# Patient Record
Sex: FEMALE | Race: ASIAN | NOT HISPANIC OR LATINO | Employment: OTHER | URBAN - METROPOLITAN AREA
[De-identification: names, ages, dates, MRNs, and addresses within clinical notes are randomized per-mention and may not be internally consistent; named-entity substitution may affect disease eponyms.]

---

## 2023-04-21 DIAGNOSIS — E78.5 HYPERLIPIDEMIA, UNSPECIFIED HYPERLIPIDEMIA TYPE: Primary | ICD-10-CM

## 2023-04-21 RX ORDER — ATORVASTATIN CALCIUM 40 MG/1
40 TABLET, FILM COATED ORAL DAILY
COMMUNITY
Start: 2014-09-15 | End: 2023-04-21 | Stop reason: SDUPTHER

## 2023-04-21 RX ORDER — ATORVASTATIN CALCIUM 40 MG/1
40 TABLET, FILM COATED ORAL DAILY
Qty: 30 TABLET | Refills: 2 | Status: SHIPPED | OUTPATIENT
Start: 2023-04-21

## 2023-06-19 PROBLEM — E11.9 DIABETES MELLITUS TYPE 2, CONTROLLED (MULTI): Status: ACTIVE | Noted: 2023-06-19

## 2023-06-19 PROBLEM — E04.1 LEFT THYROID NODULE: Status: ACTIVE | Noted: 2023-06-19

## 2023-06-19 PROBLEM — C50.919 BREAST CANCER (MULTI): Status: ACTIVE | Noted: 2023-06-19

## 2023-06-19 PROBLEM — E01.0 THYROMEGALY: Status: ACTIVE | Noted: 2023-06-19

## 2023-06-19 PROBLEM — E78.5 HYPERLIPIDEMIA: Status: ACTIVE | Noted: 2023-06-19

## 2023-09-05 DIAGNOSIS — E11.8 CONTROLLED DIABETES MELLITUS TYPE 2 WITH COMPLICATIONS, UNSPECIFIED WHETHER LONG TERM INSULIN USE (MULTI): Primary | ICD-10-CM

## 2023-09-05 RX ORDER — GLIMEPIRIDE 2 MG/1
TABLET ORAL
Qty: 180 TABLET | Refills: 3 | Status: SHIPPED | OUTPATIENT
Start: 2023-09-05

## 2024-11-04 ENCOUNTER — HOSPITAL ENCOUNTER (OUTPATIENT)
Dept: RADIOLOGY | Facility: CLINIC | Age: 79
Discharge: HOME | End: 2024-11-04
Payer: MEDICARE

## 2024-11-04 VITALS — HEIGHT: 63 IN | WEIGHT: 134.26 LBS | BODY MASS INDEX: 23.79 KG/M2

## 2024-11-04 DIAGNOSIS — Z12.31 ENCOUNTER FOR SCREENING MAMMOGRAM FOR MALIGNANT NEOPLASM OF BREAST: ICD-10-CM

## 2024-11-04 PROCEDURE — 77067 SCR MAMMO BI INCL CAD: CPT | Mod: LEFT SIDE | Performed by: RADIOLOGY

## 2024-11-04 PROCEDURE — 77067 SCR MAMMO BI INCL CAD: CPT | Mod: 52,LT

## 2024-11-04 PROCEDURE — 77063 BREAST TOMOSYNTHESIS BI: CPT | Mod: LEFT SIDE | Performed by: RADIOLOGY

## 2024-12-07 ENCOUNTER — APPOINTMENT (OUTPATIENT)
Dept: CARDIOLOGY | Facility: HOSPITAL | Age: 79
End: 2024-12-07
Payer: MEDICARE

## 2024-12-07 ENCOUNTER — APPOINTMENT (OUTPATIENT)
Dept: RADIOLOGY | Facility: HOSPITAL | Age: 79
End: 2024-12-07
Payer: MEDICARE

## 2024-12-07 ENCOUNTER — HOSPITAL ENCOUNTER (OUTPATIENT)
Facility: HOSPITAL | Age: 79
Setting detail: OBSERVATION
Discharge: HOME HEALTH | End: 2024-12-08
Attending: EMERGENCY MEDICINE | Admitting: SURGERY
Payer: MEDICARE
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
## 2024-12-07 DIAGNOSIS — S32.415A CLOSED NONDISPLACED FRACTURE OF ANTERIOR WALL OF LEFT ACETABULUM, INITIAL ENCOUNTER (MULTI): ICD-10-CM

## 2024-12-07 DIAGNOSIS — R52 ACUTE PAIN: ICD-10-CM
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
## 2024-12-07 LAB
BASOPHILS # BLD AUTO: 0.04 X10*3/UL (ref 0–0.1)
BASOPHILS NFR BLD AUTO: 0.4 %
EOSINOPHIL # BLD AUTO: 0.01 X10*3/UL (ref 0–0.4)
EOSINOPHIL NFR BLD AUTO: 0.1 %
ERYTHROCYTE [DISTWIDTH] IN BLOOD BY AUTOMATED COUNT: 11.8 % (ref 11.5–14.5)
HCT VFR BLD AUTO: 35.9 % (ref 36–46)
HGB BLD-MCNC: 12.2 G/DL (ref 12–16)
IMM GRANULOCYTES # BLD AUTO: 0.07 X10*3/UL (ref 0–0.5)
IMM GRANULOCYTES NFR BLD AUTO: 0.7 % (ref 0–0.9)
LYMPHOCYTES # BLD AUTO: 1.23 X10*3/UL (ref 0.8–3)
LYMPHOCYTES NFR BLD AUTO: 11.8 %
MCH RBC QN AUTO: 30.3 PG (ref 26–34)
MCHC RBC AUTO-ENTMCNC: 34 G/DL (ref 32–36)
MCV RBC AUTO: 89 FL (ref 80–100)
MONOCYTES # BLD AUTO: 0.47 X10*3/UL (ref 0.05–0.8)
MONOCYTES NFR BLD AUTO: 4.5 %
NEUTROPHILS # BLD AUTO: 8.62 X10*3/UL (ref 1.6–5.5)
NEUTROPHILS NFR BLD AUTO: 82.5 %
NRBC BLD-RTO: 0 /100 WBCS (ref 0–0)
PLATELET # BLD AUTO: 213 X10*3/UL (ref 150–450)
RBC # BLD AUTO: 4.03 X10*6/UL (ref 4–5.2)
WBC # BLD AUTO: 10.4 X10*3/UL (ref 4.4–11.3)

## 2024-12-07 PROCEDURE — 74176 CT ABD & PELVIS W/O CONTRAST: CPT | Mod: FOREIGN READ | Performed by: RADIOLOGY

## 2024-12-07 PROCEDURE — 73552 X-RAY EXAM OF FEMUR 2/>: CPT | Mod: FOREIGN READ | Performed by: RADIOLOGY

## 2024-12-07 PROCEDURE — 2500000001 HC RX 250 WO HCPCS SELF ADMINISTERED DRUGS (ALT 637 FOR MEDICARE OP): Performed by: EMERGENCY MEDICINE

## 2024-12-07 PROCEDURE — 72170 X-RAY EXAM OF PELVIS: CPT | Mod: FOREIGN READ | Performed by: RADIOLOGY

## 2024-12-07 PROCEDURE — 73552 X-RAY EXAM OF FEMUR 2/>: CPT | Mod: LT

## 2024-12-07 PROCEDURE — 96372 THER/PROPH/DIAG INJ SC/IM: CPT | Performed by: EMERGENCY MEDICINE

## 2024-12-07 PROCEDURE — G0378 HOSPITAL OBSERVATION PER HR: HCPCS

## 2024-12-07 PROCEDURE — 36415 COLL VENOUS BLD VENIPUNCTURE: CPT | Performed by: EMERGENCY MEDICINE

## 2024-12-07 PROCEDURE — 93005 ELECTROCARDIOGRAM TRACING: CPT

## 2024-12-07 PROCEDURE — 2500000004 HC RX 250 GENERAL PHARMACY W/ HCPCS (ALT 636 FOR OP/ED): Performed by: EMERGENCY MEDICINE

## 2024-12-07 PROCEDURE — 85025 COMPLETE CBC W/AUTO DIFF WBC: CPT | Performed by: EMERGENCY MEDICINE

## 2024-12-07 PROCEDURE — 84075 ASSAY ALKALINE PHOSPHATASE: CPT | Performed by: EMERGENCY MEDICINE

## 2024-12-07 PROCEDURE — 74176 CT ABD & PELVIS W/O CONTRAST: CPT

## 2024-12-07 PROCEDURE — 99285 EMERGENCY DEPT VISIT HI MDM: CPT | Mod: 25 | Performed by: EMERGENCY MEDICINE

## 2024-12-07 PROCEDURE — 72170 X-RAY EXAM OF PELVIS: CPT

## 2024-12-07 RX ORDER — ONDANSETRON HYDROCHLORIDE 2 MG/ML
4 INJECTION, SOLUTION INTRAVENOUS EVERY 8 HOURS PRN
Status: DISCONTINUED | OUTPATIENT
Start: 2024-12-07 | End: 2024-12-08 | Stop reason: HOSPADM

## 2024-12-07 RX ORDER — OXYCODONE AND ACETAMINOPHEN 5; 325 MG/1; MG/1
1 TABLET ORAL ONCE
Status: COMPLETED | OUTPATIENT
Start: 2024-12-07 | End: 2024-12-07

## 2024-12-07 RX ORDER — LISINOPRIL 10 MG/1
10 TABLET ORAL DAILY
Status: DISCONTINUED | OUTPATIENT
Start: 2024-12-08 | End: 2024-12-08 | Stop reason: HOSPADM

## 2024-12-07 RX ORDER — KETOROLAC TROMETHAMINE 30 MG/ML
30 INJECTION, SOLUTION INTRAMUSCULAR; INTRAVENOUS ONCE
Status: COMPLETED | OUTPATIENT
Start: 2024-12-07 | End: 2024-12-07

## 2024-12-07 RX ORDER — GLIMEPIRIDE 2 MG/1
2 TABLET ORAL
Status: DISCONTINUED | OUTPATIENT
Start: 2024-12-08 | End: 2024-12-08 | Stop reason: HOSPADM

## 2024-12-07 RX ORDER — ACETAMINOPHEN 325 MG/1
975 TABLET ORAL EVERY 6 HOURS SCHEDULED
Status: DISCONTINUED | OUTPATIENT
Start: 2024-12-08 | End: 2024-12-08 | Stop reason: HOSPADM

## 2024-12-07 RX ORDER — ATORVASTATIN CALCIUM 40 MG/1
40 TABLET, FILM COATED ORAL DAILY
Status: DISCONTINUED | OUTPATIENT
Start: 2024-12-08 | End: 2024-12-08 | Stop reason: HOSPADM

## 2024-12-07 RX ORDER — OXYCODONE HYDROCHLORIDE 5 MG/1
5 TABLET ORAL EVERY 6 HOURS PRN
Status: DISCONTINUED | OUTPATIENT
Start: 2024-12-07 | End: 2024-12-08

## 2024-12-07 RX ORDER — GABAPENTIN 100 MG/1
100 CAPSULE ORAL 3 TIMES DAILY
Status: DISCONTINUED | OUTPATIENT
Start: 2024-12-07 | End: 2024-12-08 | Stop reason: HOSPADM

## 2024-12-07 RX ORDER — LIDOCAINE 560 MG/1
1 PATCH PERCUTANEOUS; TOPICAL; TRANSDERMAL DAILY
Status: DISCONTINUED | OUTPATIENT
Start: 2024-12-08 | End: 2024-12-08 | Stop reason: HOSPADM

## 2024-12-07 RX ORDER — METFORMIN HYDROCHLORIDE 500 MG/1
500 TABLET, EXTENDED RELEASE ORAL 2 TIMES DAILY
Status: DISCONTINUED | OUTPATIENT
Start: 2024-12-07 | End: 2024-12-08 | Stop reason: HOSPADM

## 2024-12-07 RX ADMIN — OXYCODONE HYDROCHLORIDE AND ACETAMINOPHEN 1 TABLET: 5; 325 TABLET ORAL at 22:07

## 2024-12-07 RX ADMIN — KETOROLAC TROMETHAMINE 30 MG: 30 INJECTION, SOLUTION INTRAMUSCULAR at 20:22

## 2024-12-07 ASSESSMENT — PAIN DESCRIPTION - FREQUENCY: FREQUENCY: CONSTANT/CONTINUOUS

## 2024-12-07 ASSESSMENT — LIFESTYLE VARIABLES
TOTAL SCORE: 0
HAVE YOU EVER FELT YOU SHOULD CUT DOWN ON YOUR DRINKING: NO
HAVE PEOPLE ANNOYED YOU BY CRITICIZING YOUR DRINKING: NO
EVER FELT BAD OR GUILTY ABOUT YOUR DRINKING: NO
EVER HAD A DRINK FIRST THING IN THE MORNING TO STEADY YOUR NERVES TO GET RID OF A HANGOVER: NO

## 2024-12-07 ASSESSMENT — PAIN - FUNCTIONAL ASSESSMENT: PAIN_FUNCTIONAL_ASSESSMENT: 0-10

## 2024-12-07 ASSESSMENT — PAIN DESCRIPTION - LOCATION
LOCATION: HIP
LOCATION: HIP

## 2024-12-07 ASSESSMENT — PAIN DESCRIPTION - ORIENTATION
ORIENTATION: LEFT
ORIENTATION: LEFT

## 2024-12-07 ASSESSMENT — PAIN SCALES - WONG BAKER: WONGBAKER_NUMERICALRESPONSE: HURTS LITTLE BIT

## 2024-12-07 ASSESSMENT — COLUMBIA-SUICIDE SEVERITY RATING SCALE - C-SSRS
1. IN THE PAST MONTH, HAVE YOU WISHED YOU WERE DEAD OR WISHED YOU COULD GO TO SLEEP AND NOT WAKE UP?: NO
2. HAVE YOU ACTUALLY HAD ANY THOUGHTS OF KILLING YOURSELF?: NO
6. HAVE YOU EVER DONE ANYTHING, STARTED TO DO ANYTHING, OR PREPARED TO DO ANYTHING TO END YOUR LIFE?: NO

## 2024-12-07 ASSESSMENT — PAIN DESCRIPTION - PAIN TYPE: TYPE: ACUTE PAIN

## 2024-12-07 ASSESSMENT — PAIN SCALES - GENERAL: PAINLEVEL_OUTOF10: 10 - WORST POSSIBLE PAIN

## 2024-12-08 VITALS
HEART RATE: 79 BPM | OXYGEN SATURATION: 96 % | DIASTOLIC BLOOD PRESSURE: 61 MMHG | SYSTOLIC BLOOD PRESSURE: 131 MMHG | WEIGHT: 125 LBS | RESPIRATION RATE: 16 BRPM | HEIGHT: 64 IN | BODY MASS INDEX: 21.34 KG/M2 | TEMPERATURE: 97.5 F
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
## 2024-12-08 LAB
ALBUMIN SERPL BCP-MCNC: 4.1 G/DL (ref 3.4–5)
ALP SERPL-CCNC: 71 U/L (ref 33–136)
ALT SERPL W P-5'-P-CCNC: 16 U/L (ref 7–45)
ANION GAP SERPL CALC-SCNC: 10 MMOL/L (ref 10–20)
ANION GAP SERPL CALC-SCNC: 12 MMOL/L (ref 10–20)
AST SERPL W P-5'-P-CCNC: 19 U/L (ref 9–39)
BILIRUB SERPL-MCNC: 0.6 MG/DL (ref 0–1.2)
BUN SERPL-MCNC: 18 MG/DL (ref 6–23)
BUN SERPL-MCNC: 19 MG/DL (ref 6–23)
CALCIUM SERPL-MCNC: 8.7 MG/DL (ref 8.6–10.3)
CALCIUM SERPL-MCNC: 9.2 MG/DL (ref 8.6–10.3)
CHLORIDE SERPL-SCNC: 102 MMOL/L (ref 98–107)
CHLORIDE SERPL-SCNC: 104 MMOL/L (ref 98–107)
CO2 SERPL-SCNC: 26 MMOL/L (ref 21–32)
CO2 SERPL-SCNC: 27 MMOL/L (ref 21–32)
CREAT SERPL-MCNC: 0.86 MG/DL (ref 0.5–1.05)
CREAT SERPL-MCNC: 1.04 MG/DL (ref 0.5–1.05)
EGFRCR SERPLBLD CKD-EPI 2021: 54 ML/MIN/1.73M*2
EGFRCR SERPLBLD CKD-EPI 2021: 68 ML/MIN/1.73M*2
ERYTHROCYTE [DISTWIDTH] IN BLOOD BY AUTOMATED COUNT: 11.8 % (ref 11.5–14.5)
GLUCOSE BLD MANUAL STRIP-MCNC: 98 MG/DL (ref 74–99)
GLUCOSE SERPL-MCNC: 105 MG/DL (ref 74–99)
GLUCOSE SERPL-MCNC: 211 MG/DL (ref 74–99)
HCT VFR BLD AUTO: 33.6 % (ref 36–46)
HGB BLD-MCNC: 11.1 G/DL (ref 12–16)
MCH RBC QN AUTO: 29.9 PG (ref 26–34)
MCHC RBC AUTO-ENTMCNC: 33 G/DL (ref 32–36)
MCV RBC AUTO: 91 FL (ref 80–100)
NRBC BLD-RTO: 0 /100 WBCS (ref 0–0)
PLATELET # BLD AUTO: 191 X10*3/UL (ref 150–450)
POTASSIUM SERPL-SCNC: 4 MMOL/L (ref 3.5–5.3)
POTASSIUM SERPL-SCNC: 4.3 MMOL/L (ref 3.5–5.3)
PROT SERPL-MCNC: 6.8 G/DL (ref 6.4–8.2)
RBC # BLD AUTO: 3.71 X10*6/UL (ref 4–5.2)
SODIUM SERPL-SCNC: 136 MMOL/L (ref 136–145)
SODIUM SERPL-SCNC: 137 MMOL/L (ref 136–145)
WBC # BLD AUTO: 5.7 X10*3/UL (ref 4.4–11.3)

## 2024-12-08 PROCEDURE — 99223 1ST HOSP IP/OBS HIGH 75: CPT | Performed by: NURSE PRACTITIONER

## 2024-12-08 PROCEDURE — 99223 1ST HOSP IP/OBS HIGH 75: CPT | Performed by: SURGERY

## 2024-12-08 PROCEDURE — 2500000004 HC RX 250 GENERAL PHARMACY W/ HCPCS (ALT 636 FOR OP/ED): Performed by: NURSE PRACTITIONER

## 2024-12-08 PROCEDURE — 97165 OT EVAL LOW COMPLEX 30 MIN: CPT | Mod: GO

## 2024-12-08 PROCEDURE — 80048 BASIC METABOLIC PNL TOTAL CA: CPT | Performed by: NURSE PRACTITIONER

## 2024-12-08 PROCEDURE — G0378 HOSPITAL OBSERVATION PER HR: HCPCS

## 2024-12-08 PROCEDURE — 96374 THER/PROPH/DIAG INJ IV PUSH: CPT

## 2024-12-08 PROCEDURE — 85027 COMPLETE CBC AUTOMATED: CPT | Performed by: NURSE PRACTITIONER

## 2024-12-08 PROCEDURE — 2500000005 HC RX 250 GENERAL PHARMACY W/O HCPCS: Performed by: NURSE PRACTITIONER

## 2024-12-08 PROCEDURE — 97161 PT EVAL LOW COMPLEX 20 MIN: CPT | Mod: GP

## 2024-12-08 PROCEDURE — 2500000001 HC RX 250 WO HCPCS SELF ADMINISTERED DRUGS (ALT 637 FOR MEDICARE OP): Performed by: NURSE PRACTITIONER

## 2024-12-08 PROCEDURE — 99221 1ST HOSP IP/OBS SF/LOW 40: CPT | Performed by: STUDENT IN AN ORGANIZED HEALTH CARE EDUCATION/TRAINING PROGRAM

## 2024-12-08 PROCEDURE — 2500000001 HC RX 250 WO HCPCS SELF ADMINISTERED DRUGS (ALT 637 FOR MEDICARE OP): Performed by: REGISTERED NURSE

## 2024-12-08 PROCEDURE — 99238 HOSP IP/OBS DSCHRG MGMT 30/<: CPT | Performed by: REGISTERED NURSE

## 2024-12-08 PROCEDURE — 82947 ASSAY GLUCOSE BLOOD QUANT: CPT

## 2024-12-08 PROCEDURE — 36415 COLL VENOUS BLD VENIPUNCTURE: CPT | Performed by: NURSE PRACTITIONER

## 2024-12-08 RX ORDER — IBUPROFEN 600 MG/1
600 TABLET ORAL 3 TIMES DAILY PRN
Qty: 12 TABLET | Refills: 0 | Status: SHIPPED | OUTPATIENT
Start: 2024-12-08 | End: 2024-12-15

## 2024-12-08 RX ORDER — GABAPENTIN 100 MG/1
100 CAPSULE ORAL 3 TIMES DAILY
Qty: 21 CAPSULE | Refills: 0 | Status: SHIPPED | OUTPATIENT
Start: 2024-12-08 | End: 2024-12-15

## 2024-12-08 RX ORDER — OXYCODONE HYDROCHLORIDE 5 MG/1
5 TABLET ORAL EVERY 6 HOURS PRN
Status: DISCONTINUED | OUTPATIENT
Start: 2024-12-08 | End: 2024-12-08 | Stop reason: HOSPADM

## 2024-12-08 RX ORDER — ACETAMINOPHEN 325 MG/1
1000 TABLET ORAL EVERY 6 HOURS SCHEDULED
Qty: 84 TABLET | Refills: 0 | Status: SHIPPED | OUTPATIENT
Start: 2024-12-08 | End: 2024-12-15

## 2024-12-08 RX ORDER — OXYCODONE HYDROCHLORIDE 5 MG/1
5 TABLET ORAL 3 TIMES DAILY PRN
Qty: 10 TABLET | Refills: 0 | Status: SHIPPED | OUTPATIENT
Start: 2024-12-08

## 2024-12-08 RX ADMIN — GLIMEPIRIDE 2 MG: 2 TABLET ORAL at 08:35

## 2024-12-08 RX ADMIN — METFORMIN HYDROCHLORIDE 500 MG: 500 TABLET, EXTENDED RELEASE ORAL at 08:36

## 2024-12-08 RX ADMIN — GABAPENTIN 100 MG: 100 CAPSULE ORAL at 00:34

## 2024-12-08 RX ADMIN — LISINOPRIL 10 MG: 10 TABLET ORAL at 08:35

## 2024-12-08 RX ADMIN — ATORVASTATIN CALCIUM 40 MG: 40 TABLET, FILM COATED ORAL at 08:35

## 2024-12-08 RX ADMIN — ACETAMINOPHEN 975 MG: 325 TABLET, FILM COATED ORAL at 02:01

## 2024-12-08 RX ADMIN — LIDOCAINE 1 PATCH: 4 PATCH TOPICAL at 08:36

## 2024-12-08 RX ADMIN — HYDROMORPHONE HYDROCHLORIDE 0.5 MG: 1 INJECTION, SOLUTION INTRAMUSCULAR; INTRAVENOUS; SUBCUTANEOUS at 02:08

## 2024-12-08 RX ADMIN — OXYCODONE HYDROCHLORIDE 5 MG: 5 TABLET ORAL at 12:48

## 2024-12-08 RX ADMIN — GABAPENTIN 100 MG: 100 CAPSULE ORAL at 08:35

## 2024-12-08 RX ADMIN — ACETAMINOPHEN 975 MG: 325 TABLET, FILM COATED ORAL at 06:05

## 2024-12-08 SDOH — ECONOMIC STABILITY: HOUSING INSECURITY: IN THE LAST 12 MONTHS, WAS THERE A TIME WHEN YOU WERE NOT ABLE TO PAY THE MORTGAGE OR RENT ON TIME?: NO

## 2024-12-08 SDOH — SOCIAL STABILITY: SOCIAL INSECURITY
WITHIN THE LAST YEAR, HAVE YOU BEEN KICKED, HIT, SLAPPED, OR OTHERWISE PHYSICALLY HURT BY YOUR PARTNER OR EX-PARTNER?: NO

## 2024-12-08 SDOH — ECONOMIC STABILITY: HOUSING INSECURITY: AT ANY TIME IN THE PAST 12 MONTHS, WERE YOU HOMELESS OR LIVING IN A SHELTER (INCLUDING NOW)?: NO

## 2024-12-08 SDOH — ECONOMIC STABILITY: FOOD INSECURITY: WITHIN THE PAST 12 MONTHS, THE FOOD YOU BOUGHT JUST DIDN'T LAST AND YOU DIDN'T HAVE MONEY TO GET MORE.: NEVER TRUE

## 2024-12-08 SDOH — SOCIAL STABILITY: SOCIAL INSECURITY: HAVE YOU HAD ANY THOUGHTS OF HARMING ANYONE ELSE?: NO

## 2024-12-08 SDOH — SOCIAL STABILITY: SOCIAL INSECURITY
WITHIN THE LAST YEAR, HAVE YOU BEEN RAPED OR FORCED TO HAVE ANY KIND OF SEXUAL ACTIVITY BY YOUR PARTNER OR EX-PARTNER?: NO

## 2024-12-08 SDOH — ECONOMIC STABILITY: FOOD INSECURITY: WITHIN THE PAST 12 MONTHS, YOU WORRIED THAT YOUR FOOD WOULD RUN OUT BEFORE YOU GOT THE MONEY TO BUY MORE.: NEVER TRUE

## 2024-12-08 SDOH — ECONOMIC STABILITY: FOOD INSECURITY: HOW HARD IS IT FOR YOU TO PAY FOR THE VERY BASICS LIKE FOOD, HOUSING, MEDICAL CARE, AND HEATING?: NOT HARD AT ALL

## 2024-12-08 SDOH — SOCIAL STABILITY: SOCIAL INSECURITY: WITHIN THE LAST YEAR, HAVE YOU BEEN AFRAID OF YOUR PARTNER OR EX-PARTNER?: NO

## 2024-12-08 SDOH — ECONOMIC STABILITY: INCOME INSECURITY: IN THE PAST 12 MONTHS HAS THE ELECTRIC, GAS, OIL, OR WATER COMPANY THREATENED TO SHUT OFF SERVICES IN YOUR HOME?: NO

## 2024-12-08 SDOH — SOCIAL STABILITY: SOCIAL INSECURITY: WITHIN THE LAST YEAR, HAVE YOU BEEN HUMILIATED OR EMOTIONALLY ABUSED IN OTHER WAYS BY YOUR PARTNER OR EX-PARTNER?: NO

## 2024-12-08 SDOH — ECONOMIC STABILITY: HOUSING INSECURITY: IN THE PAST 12 MONTHS, HOW MANY TIMES HAVE YOU MOVED WHERE YOU WERE LIVING?: 1

## 2024-12-08 SDOH — SOCIAL STABILITY: SOCIAL INSECURITY: ARE THERE ANY APPARENT SIGNS OF INJURIES/BEHAVIORS THAT COULD BE RELATED TO ABUSE/NEGLECT?: NO

## 2024-12-08 SDOH — SOCIAL STABILITY: SOCIAL INSECURITY: ARE YOU OR HAVE YOU BEEN THREATENED OR ABUSED PHYSICALLY, EMOTIONALLY, OR SEXUALLY BY ANYONE?: NO

## 2024-12-08 SDOH — SOCIAL STABILITY: SOCIAL INSECURITY: ABUSE: ADULT

## 2024-12-08 SDOH — SOCIAL STABILITY: SOCIAL INSECURITY: HAS ANYONE EVER THREATENED TO HURT YOUR FAMILY OR YOUR PETS?: NO

## 2024-12-08 SDOH — SOCIAL STABILITY: SOCIAL INSECURITY: WERE YOU ABLE TO COMPLETE ALL THE BEHAVIORAL HEALTH SCREENINGS?: YES

## 2024-12-08 SDOH — SOCIAL STABILITY: SOCIAL INSECURITY: HAVE YOU HAD THOUGHTS OF HARMING ANYONE ELSE?: NO

## 2024-12-08 SDOH — ECONOMIC STABILITY: TRANSPORTATION INSECURITY: IN THE PAST 12 MONTHS, HAS LACK OF TRANSPORTATION KEPT YOU FROM MEDICAL APPOINTMENTS OR FROM GETTING MEDICATIONS?: NO

## 2024-12-08 SDOH — SOCIAL STABILITY: SOCIAL INSECURITY: DO YOU FEEL UNSAFE GOING BACK TO THE PLACE WHERE YOU ARE LIVING?: NO

## 2024-12-08 SDOH — SOCIAL STABILITY: SOCIAL INSECURITY: DOES ANYONE TRY TO KEEP YOU FROM HAVING/CONTACTING OTHER FRIENDS OR DOING THINGS OUTSIDE YOUR HOME?: NO

## 2024-12-08 SDOH — SOCIAL STABILITY: SOCIAL INSECURITY: DO YOU FEEL ANYONE HAS EXPLOITED OR TAKEN ADVANTAGE OF YOU FINANCIALLY OR OF YOUR PERSONAL PROPERTY?: NO

## 2024-12-08 ASSESSMENT — ACTIVITIES OF DAILY LIVING (ADL)
LACK_OF_TRANSPORTATION: NO
TOILETING: INDEPENDENT
ADEQUATE_TO_COMPLETE_ADL: YES
BATHING: INDEPENDENT
HEARING - LEFT EAR: HEARING AID
JUDGMENT_ADEQUATE_SAFELY_COMPLETE_DAILY_ACTIVITIES: YES
HEARING - RIGHT EAR: HEARING AID
WALKS IN HOME: INDEPENDENT
FEEDING YOURSELF: INDEPENDENT
GROOMING: INDEPENDENT
PATIENT'S MEMORY ADEQUATE TO SAFELY COMPLETE DAILY ACTIVITIES?: YES
LACK_OF_TRANSPORTATION: NO
DRESSING YOURSELF: INDEPENDENT

## 2024-12-08 ASSESSMENT — PAIN SCALES - GENERAL
PAINLEVEL_OUTOF10: 2
PAINLEVEL_OUTOF10: 9
PAINLEVEL_OUTOF10: 6
PAINLEVEL_OUTOF10: 2
PAINLEVEL_OUTOF10: 3
PAINLEVEL_OUTOF10: 2
PAINLEVEL_OUTOF10: 9
PAINLEVEL_OUTOF10: 5 - MODERATE PAIN
PAINLEVEL_OUTOF10: 6

## 2024-12-08 ASSESSMENT — COGNITIVE AND FUNCTIONAL STATUS - GENERAL
PATIENT BASELINE BEDBOUND: NO
CLIMB 3 TO 5 STEPS WITH RAILING: A LITTLE
DAILY ACTIVITIY SCORE: 21
CLIMB 3 TO 5 STEPS WITH RAILING: A LITTLE
CLIMB 3 TO 5 STEPS WITH RAILING: A LITTLE
TURNING FROM BACK TO SIDE WHILE IN FLAT BAD: A LITTLE
WALKING IN HOSPITAL ROOM: A LITTLE
MOVING TO AND FROM BED TO CHAIR: A LITTLE
TOILETING: A LITTLE
MOBILITY SCORE: 18
DAILY ACTIVITIY SCORE: 24
WALKING IN HOSPITAL ROOM: A LITTLE
MOVING FROM LYING ON BACK TO SITTING ON SIDE OF FLAT BED WITH BEDRAILS: A LITTLE
WALKING IN HOSPITAL ROOM: A LITTLE
STANDING UP FROM CHAIR USING ARMS: A LITTLE
HELP NEEDED FOR BATHING: A LITTLE
MOBILITY SCORE: 22
DAILY ACTIVITIY SCORE: 24
MOBILITY SCORE: 22
DRESSING REGULAR LOWER BODY CLOTHING: A LITTLE

## 2024-12-08 ASSESSMENT — LIFESTYLE VARIABLES
AUDIT-C TOTAL SCORE: 0
SKIP TO QUESTIONS 9-10: 1
HOW OFTEN DO YOU HAVE 6 OR MORE DRINKS ON ONE OCCASION: NEVER
HOW OFTEN DO YOU HAVE A DRINK CONTAINING ALCOHOL: NEVER
AUDIT-C TOTAL SCORE: 0
HOW MANY STANDARD DRINKS CONTAINING ALCOHOL DO YOU HAVE ON A TYPICAL DAY: PATIENT DOES NOT DRINK

## 2024-12-08 ASSESSMENT — PAIN DESCRIPTION - LOCATION
LOCATION: HIP

## 2024-12-08 ASSESSMENT — PAIN - FUNCTIONAL ASSESSMENT
PAIN_FUNCTIONAL_ASSESSMENT: 0-10

## 2024-12-08 ASSESSMENT — PAIN SCALES - WONG BAKER: WONGBAKER_NUMERICALRESPONSE: NO HURT

## 2024-12-08 ASSESSMENT — PAIN DESCRIPTION - ORIENTATION
ORIENTATION: LEFT

## 2024-12-08 ASSESSMENT — PATIENT HEALTH QUESTIONNAIRE - PHQ9
1. LITTLE INTEREST OR PLEASURE IN DOING THINGS: NOT AT ALL
SUM OF ALL RESPONSES TO PHQ9 QUESTIONS 1 & 2: 0
2. FEELING DOWN, DEPRESSED OR HOPELESS: NOT AT ALL

## 2024-12-08 ASSESSMENT — PAIN DESCRIPTION - PAIN TYPE: TYPE: ACUTE PAIN

## 2024-12-08 NOTE — H&P
Trauma History and Physical        Subjective   Patient is a 79 y.o. female admitted on 12/7/2024  7:41 PM  Activation Date: n/a  Activation Time: n/a  Trauma Activation Level: n/a  Date of injury: 12/7/24  Time of injury: 1600    HPI:  Erin Martel is a 79 y.o. female with a PMH of HTN, HLD, DM type II, and breast CA s/p right mastectomy who presented to the ED today after a fall in her driveway.  Patient reports that she went to her mailbox around 4 PM and after she turned around, she believes she slipped on some ice and fell down onto her left hip area.  Patient denies any LOC or hitting her head.  She reports that she was able to get on her hands and knees and crawl up the driveway in order to get help.  She also states that she was able to stand after the fall, but with increased pain in the left hip.  She states that she took Tylenol around 5 PM for the pain, but it did not help much, so she decided to come to the ED.  She denies any pain to any other area besides her left hip.  Patient denies any use of blood thinners.  Patient does have a temporary 2-week Holter monitor to her left chest.  She reports that this is part of her routine wellness program to prophylactically check for any arrhythmias in asymptomatic patients.  Patient denies any lightheadedness, dizziness, chest pain, SOB, numbness, or tingling.    CT A/P done in ED shows fractures of the anterior left acetabulum as well as a left inferior pubic ramus.  There is also a small hemorrhage in the left hemipelvis with prominent edema in the left adductor musculature.  There is a compression deformity at L3, which the patient states is chronic from a previous fall.  Patient's lab work is unremarkable, other than her glucose of 211.  Patient to be admitted to the trauma service for observation and evaluation by PT/OT for potential needs.      Mechanism of injury: fall  Method of Arrival: private vehicle  Prior to arrival: n/a    PRIMARY  SURVEY:  Airway: patent  Breathing: equal breath sounds and unlabored  Circulation: pulses intact and equal and no obvious source of hemorrhage  Disability:  GCS 15, A&Ox3  Exposure/Environment: clothing removed and injuries noted    Procedures: n/a    SECONDARY SURVEY:    Past Medical History:   Diagnosis Date    Malignant neoplasm of unspecified site of right female breast     Breast carcinoma, female, right     Past Surgical History:   Procedure Laterality Date    MASTECTOMY Right 1987     (Not in a hospital admission)    Patient has no known allergies.     Family History   Problem Relation Name Age of Onset    Breast cancer Sister  60 - 69       Objective   Vitals:  Most Recent:  Vitals:    12/07/24 1928   BP: 143/63   Pulse: 99   Resp: 16   Temp: 36.8 °C (98.2 °F)   SpO2: 98%       24hr Min/Max:  Temp  Min: 36.8 °C (98.2 °F)  Max: 36.8 °C (98.2 °F)  Pulse  Min: 99  Max: 99  BP  Min: 143/63  Max: 143/63  Resp  Min: 16  Max: 16  SpO2  Min: 98 %  Max: 98 %      Review of Systems:  A 10 point review of system is negative except for what is mentioned in the HPI        Physical exam:    NEURO: A&O x3, GCS, CN II-XII intact, DAMON equally, muscle strength 5/5 in BUE and BLE, no sensory deficits  HEENT: Head: NC/AT, No lacerations or abrasions, no bony step offs, midface stable. Eye: PERRL, EOMI. Ears: Canals without blood or CSF drainage, TMs clear, external ear without laceration. Nose: Nasal septum midline, no crepitus or septal hematoma. Throat: Oral mucosa and tongue without lacerations, teeth in place.   NECK: No cervical spine tenderness or step offs, no lacerations or abrasions, tracheal midline. No JVD.  RESPIRATORY/CHEST: No abrasions, contusions, crepitus or tenderness to palpation. Non-labored, equal chest expansion, CTAB, no wheezes/rhonchi. Holter monitor to left chest (preventative)  CV: RRR, no M/R/G. Pulses bilateral: 2+ radial, 2+DP, 2+PT,  2+femoral and 2+ carotid. Cap refill < 2sec  ABDOMEN: soft,  nontender, nondistended, +BS. No scars, abrasions or lacerations.  PELVIS: Stable to AP and lateral compression, normal external genitalia, no pain with palpation/manipulation  BACK/SPINE: No T/L spine tenderness, no step offs or deformity noted. No abrasions, hematomas or lacerations noted.   EXTREMITIES: No edema or cyanosis. Normal ROM w/o pain to right leg. Patient has limited ROM to left leg due to pain. Tenderness over lateral hip area with very faint bruising. No deformities, lacerations or contusions.   SKIN: warm and dry, no rash or lesions.     Lab/Radiology/Diagnostic Review:  Results for orders placed or performed during the hospital encounter of 12/07/24 (from the past 24 hours)   CBC and Auto Differential   Result Value Ref Range    WBC 10.4 4.4 - 11.3 x10*3/uL    nRBC 0.0 0.0 - 0.0 /100 WBCs    RBC 4.03 4.00 - 5.20 x10*6/uL    Hemoglobin 12.2 12.0 - 16.0 g/dL    Hematocrit 35.9 (L) 36.0 - 46.0 %    MCV 89 80 - 100 fL    MCH 30.3 26.0 - 34.0 pg    MCHC 34.0 32.0 - 36.0 g/dL    RDW 11.8 11.5 - 14.5 %    Platelets 213 150 - 450 x10*3/uL    Neutrophils % 82.5 40.0 - 80.0 %    Immature Granulocytes %, Automated 0.7 0.0 - 0.9 %    Lymphocytes % 11.8 13.0 - 44.0 %    Monocytes % 4.5 2.0 - 10.0 %    Eosinophils % 0.1 0.0 - 6.0 %    Basophils % 0.4 0.0 - 2.0 %    Neutrophils Absolute 8.62 (H) 1.60 - 5.50 x10*3/uL    Immature Granulocytes Absolute, Automated 0.07 0.00 - 0.50 x10*3/uL    Lymphocytes Absolute 1.23 0.80 - 3.00 x10*3/uL    Monocytes Absolute 0.47 0.05 - 0.80 x10*3/uL    Eosinophils Absolute 0.01 0.00 - 0.40 x10*3/uL    Basophils Absolute 0.04 0.00 - 0.10 x10*3/uL     CT abdomen pelvis wo IV contrast    Result Date: 12/7/2024  STUDY: CT Abdomen and Pelvis without IV Contrast; 12/7/2024 at 9:06 PM INDICATION: Status post fall. COMPARISON: None available. ACCESSION NUMBER(S): [id number] ORDERING CLINICIAN: ANGELITO BOLAND TECHNIQUE: CT of the abdomen and pelvis was performed.  Contiguous axial images  were obtained at 3 mm slice thickness through the abdomen and pelvis. Coronal and sagittal reconstructions at 3 mm slice thickness were performed. No intravenous contrast was administered.  Automated mA/kV exposure control was utilized and patient examination was performed in strict accordance with principles of ALARA. FINDINGS: Please note that the evaluation of vessels, lymph nodes and organs is limited without intravenous contrast.  Evaluation of the abdominal viscera is suboptimal due to lack of IV contrast.  No acute findings are seen within the unenhanced liver, spleen, pancreas, or adrenal glands.  No acute findings are seen within either unenhanced kidney.  Cholecystectomy clips are present. No dilated loops of small bowel or colon are visualized.  There are diverticula seen in the sigmoid colon.  There is no evidence of diverticulitis.  There is no evidence for free intraperitoneal air. The appendix is normal in appearance.  No enlarged lymph nodes are seen within the pelvic sidewalls, iliac chains, or retroperitoneum. No prominent edema is seen within the psoas musculature.  There are fractures involving the anterior aspect of left acetabulum, as well as the left pubic ramus.  There is minimal hemorrhage seen in the left pelvic sidewall.  There is edema seen within the adductor musculature.  There is a compression deformity of L3, pituitary age.    1.  Fractures of the anterior left acetabulum as well as the left inferior pubic ramus.  Minimal hemorrhage in the left hemipelvis, with prominent edema in the left adductor musculature. 2.  Compression deformity of L3, of indeterminate age. 3.  Evaluation for visceral trauma is limited due to lack of IV contrast.  No acute findings appreciated within the unenhanced viscera. 4.  Sigmoid diverticulosis, with no evidence of diverticulitis. Signed by Scout Mccauley MD    XR femur left 2+ views    Result Date: 12/7/2024  STUDY: Femur Radiographs; 12/07/2024 8:09PM.   AP pelvis 8:08 PM INDICATION: Fall. COMPARISON: None Available. ACCESSION NUMBER(S): [id number], [id number] ORDERING CLINICIAN: ANGELITO BOLAND TECHNIQUE:  Two view(s) (four images) of the left femur.  One view of the AP pelvis. FINDINGS: AP view of the pelvis of shows focal irregularity of the inferior pubic ramus on the left.  This is likely related to a fracture of indeterminate age.  The rest of the pelvis appears intact.  There are degenerative changes in the included lower lumbar spine and sacroiliac joints.  Pelvic calcifications may be vascular.  Visualization is limited. Views of the left femur show no evidence of acute fracture or dislocation.  There are mild degenerative changes of the hip and knee.  The alignment is anatomic.  No soft tissue abnormality is seen.    1.Fracture of the left inferior pubic ramus of indeterminate age.  CT could be considered as clinically indicated. 2.No acute bony abnormalities of the left femur. Signed by Rebekah Lazo DO    XR pelvis 1-2 views    Result Date: 12/7/2024  STUDY: Femur Radiographs; 12/07/2024 8:09PM.  AP pelvis 8:08 PM INDICATION: Fall. COMPARISON: None Available. ACCESSION NUMBER(S): [id number], [id number] ORDERING CLINICIAN: ANGELITO BOLAND TECHNIQUE:  Two view(s) (four images) of the left femur.  One view of the AP pelvis. FINDINGS: AP view of the pelvis of shows focal irregularity of the inferior pubic ramus on the left.  This is likely related to a fracture of indeterminate age.  The rest of the pelvis appears intact.  There are degenerative changes in the included lower lumbar spine and sacroiliac joints.  Pelvic calcifications may be vascular.  Visualization is limited. Views of the left femur show no evidence of acute fracture or dislocation.  There are mild degenerative changes of the hip and knee.  The alignment is anatomic.  No soft tissue abnormality is seen.    1.Fracture of the left inferior pubic ramus of indeterminate age.  CT could be  considered as clinically indicated. 2.No acute bony abnormalities of the left femur. Signed by Rebekah Lazo DO        Assessment /Plan    Erin Martel is a 79 y.o. female with a PMH of HTN, HLD, DM type II, and breast CA s/p right mastectomy who presented to the ED today after a fall in her driveway.  Patient reports that she went to her mailbox around 4 PM and after she turned around, she believes she slipped on some ice and fell down onto her left hip area.    CT A/P done in ED shows fractures of the anterior left acetabulum as well as a left inferior pubic ramus.  There is also a small hemorrhage in the left hemipelvis with prominent edema in the left adductor musculature.  There is a compression deformity at L3, which the patient states is chronic from a previous fall.  Patient's lab work is unremarkable.  Patient to be admitted to the trauma service for observation for pain management and evaluation by PT/OT for potential needs.      List of Injuries  #Anterior left acetabulum fracture  #Left inferior pubic ramus fracture  #Minimal hemorrhage in the left hemipelvis  #Prominent edema in the left adductor musculature.  #Compression deformity of L3    >chronic from previous fall-no treatment necessary    Plan  #Anterior left acetabulum fracture  #Left inferior pubic ramus fracture  #Prominent edema in the left adductor musculature.  -Ortho consulted: no surgical intervention required for any of the injuries    >Contacted by ED provider for recs. Anticipate formal consult note on 12/8   -WBAT per ortho  -Scheduled and PRN analgesics  -Lidocaine patch to hip  -PT/OT    #Minimal hemorrhage in the left hemipelvis  -labs in AM  -hold off on DVT chemoprophylaxis until we know H/H is stable     Chronic conditions  #HTN  #HLD  #DM Type II  -continue home meds as ordered    DVT PPX: SCDs and ambulation. Will wait to make sure H/H is stable before starting Lovenox or heparin.    Dispo: Patient not medically ready for  discharge. Will need eval by PT/OT for possible discharge needs. Likely back to home without needs or only HHC.    Patient seen and will be discussed with attending on call surgeon Dr. Jasmine.    DEYA Choi-CNP

## 2024-12-08 NOTE — CARE PLAN
The patient's goals for the shift include prevent further injury    The clinical goals for the shift include Pt will have pain controlled and remain safe throughout shift    Over the shift, the patient did not make progress toward the following goals. Barriers to progression include acute illness. Recommendations to address these barriers include communication.

## 2024-12-08 NOTE — CARE PLAN
The patient's goals for the shift include prevent further injury    The clinical goals for the shift include safety    Over the shift, the patient did not make progress toward the following goals. Barriers to progression include pelvic pain. Recommendations to address these barriers include ot/pt and pain management.

## 2024-12-08 NOTE — H&P
Trauma Surgery Attending Note    My Acute Care Surgery BETTINA (Advanced Practice Provider) evaluated this patient today.  Please see that documentation for details.    I saw the patient with the BETTINA today, and personally evaluated and examined the patient.  My findings are consistent with those of the BETTINA.        Impression:      This 79-year-old female with multiple but well-controlled medical problems, who independently lives in a house in [place], who suffered a same level fall yesterday in her driveway, slipping on ice.  She landed on her left hip.  There is no mechanism of head injury, and she had no loss of consciousness.    The patient presents to the emergency department yesterday thereafter, and this was not a limited trauma activation.  Workup included x-ray of the femur, pelvis, and CT of the abdomen and pelvis.  I personally reviewed the reports and the images.    Laboratory values yesterday and today satisfactory; H&H stable    The patient was admitted for occupational and physical therapy assessment, safety assessment, and for orthopedic consultation.    Patient's pain is well-controlled.  She has been seen and evaluated by physical therapy and Occupational Therapy.  Those notes were reviewed.  She is ambulating with a walker.      List of Injuries  #Anterior left acetabulum fracture  #Left inferior pubic ramus fracture  #Minimal hemorrhage in the left hemipelvis  #Prominent edema in the left adductor musculature.  #Compression deformity of L3    >chronic from previous fall-no treatment necessary      Plan:      Patient will be discharged to home after evaluated formally by the orthopedic service  Follow-up per orthopedics  Patient will follow-up with trauma surgery only as necessary  Home occupational and physical therapy  Multimodal analgesia

## 2024-12-08 NOTE — ED TRIAGE NOTES
78 y/o female suffered mechanical fall. Denies hitting head and is not on blood thinners. Complains of leftt hip/buttocks pain. Took tylenol at 1700 hours. Fall happened at 1600 hours.

## 2024-12-08 NOTE — ED PROVIDER NOTES
Emergency Department Provider Note        History of Present Illness     History provided by: Patient  Limitations to History: None  External Records Reviewed with Brief Summary: None    HPI:  Erin Martel is a 79 y.o. female presents to ED after mechanical fall. Fell onto left hip. Has left hip and leg pain. No Head trauma or LOC. No CP or SOB. NO abdominal pain.    Physical Exam   Triage vitals:  T 36.8 °C (98.2 °F)  HR 99  /63  RR 16  O2 98 % None (Room air)    General: Awake, alert, in no acute distress  Eyes: Gaze conjugate.  No scleral icterus or injection  HENT: Normo-cephalic, atraumatic. No stridor  CV: Regular rate, regular rhythm. Radial pulses 2+ bilaterally  Resp: Breathing non-labored, speaking in full sentences.  Clear to auscultation bilaterally  GI: Soft, non-distended, non-tender. No rebound or guarding.  : Deferred  MSK/Extremities: No gross bony deformities. Moving all extremities. Left hip pain  Skin: Warm. Appropriate color  Neuro: Alert. Oriented. Face symmetric. Speech is fluent.  Gross strength and sensation intact in b/l UE and LEs  Psych: Appropriate mood and affect    Medical Decision Making & ED Course   Medical Decision Makin y.o. female presents to ED after mechanical fall. Has left hip pain. Fracture seen on CT. Will admit to trauma for pain control and PT/OT eval. Ortho consulted.  ----      Differential diagnoses considered include but are not limited to: N/A     Social Determinants of Health which Significantly Impact Care: None identified     EKG Independent Interpretation: EKG not obtained    Independent Result Review and Interpretation: Relevant laboratory and radiographic results were reviewed and independently interpreted by myself.  As necessary, they are commented on in the ED Course.    Chronic conditions affecting the patient's care: As documented above in MDM    The patient was discussed with the following consultants/services: None    Care  Considerations: As documented above in [facility]    ED Course:  Diagnoses as of 12/10/24 1635   Fall, initial encounter   Closed nondisplaced fracture of anterior wall of left acetabulum, initial encounter (Multi)     Disposition   As a result of their workup, the patient will require admission to the hospital.  The patient was informed of her diagnosis.  The patient was given the opportunity to ask questions and I answered them. The patient agreed to be admitted to the hospital.    Procedures   Procedures        Lobo Zayas MD  Emergency Medicine     Lobo Zayas MD  12/10/24 4866

## 2024-12-08 NOTE — PROGRESS NOTES
Occupational Therapy    Evaluation    Patient Name: Erin Martel  MRN: [id number]  Today's Date: 12/8/2024  Time Calculation  Start Time: 0903  Stop Time: 0923  Time Calculation (min): 20 min    Assessment  IP OT Assessment  OT Assessment: Patient presents with a decline in functional status due to pain and would benefit from O.T. services at a low intensity to improve independence with ADLs, transfers & mobility.  Prognosis: Excellent  Barriers to Discharge: None  End of Session Patient Position: Up in chair, Alarm off, not on at start of session (call light in reach)    Plan:  Treatment Interventions: ADL retraining, Neuromuscular reeducation, Compensatory technique education, Functional transfer training  OT Frequency: 2 times per week  OT Discharge Recommendations: Low intensity level of continued care  OT - OK to Discharge: Yes (from an O.T. standpoint)    Subjective     Current Problem:  Patient Active Problem List   Diagnosis    Breast cancer (Multi)    Diabetes mellitus type 2, controlled (Multi)    Hyperlipidemia    Left thyroid nodule    Thyromegaly    Fall due to ice or snow, initial encounter       General:  General  Reason for Referral: OT eval & treat (Fall, left acetabulum fracture, left inferior pubic ramus fracture, small hemorrhage left hemipelvis)  Per EMR,  nonsurgical, WBAT.  Referred By: DEYA Choi-CNP  Co-Treatment: PT  Co-Treatment Reason: to maximize patient safety & mobility  Prior to Session Communication: Bedside nurse  Patient Position Received:  (sitting up edge of bed eating)  General Comment: Per conference with RN, patient is medically stable for therapy eval      Pain:  Pain Assessment  Pain Assessment: 0-10  0-10 (Numeric) Pain Score: 6  Adams-Baker FACES Pain Rating:  (left hip/pelvis area)  Pain Interventions: Repositioned    Objective     Cognition:  Overall Cognitive Status: Within Functional Limits  Orientation Level: Oriented X4             Home Living:  Home  Living Comments: [place] alone, [place] with basement, 3 steps to enter with rail; Independent ADLs, transfers & mobility without device; active, drives. Independent IADLs. Son local/supportive; patient reports plan to go home with son initially once discharged from hospital.       ADL:  Grooming Assistance: Independent  UE Dressing Assistance: Independent  LE Dressing Assistance: Stand by  Toileting Assistance with Device: Stand by    Activity Tolerance:  Endurance: Endurance does not limit participation in activity    Bed Mobility/Transfers:      Transfers  Transfer:  (supervision sit to stand)    Ambulation/Gait Training:  Functional Mobility  Functional Mobility Performed:  (CGA/SBA with wheeled walker)    Sitting Balance:  Dynamic Sitting Balance  Dynamic Sitting-Level of Assistance: Independent    Standing Balance:  Dynamic Standing Balance  Dynamic Standing-Balance Support: Bilateral upper extremity supported  Dynamic Standing-Level of Assistance: Contact guard, Close supervision    Sensation:  Light Touch: No apparent deficits    Strength:  Strength Comments: BUE grossly WFL      Coordination:  Movements are Fluid and Coordinated: Yes       Outcome Measures: [facility] Daily Activity  Putting on and taking off regular lower body clothing: A little  Bathing (including washing, rinsing, drying): A little  Putting on and taking off regular upper body clothing: None  Toileting, which includes using toilet, bedpan or urinal: A little  Taking care of personal grooming such as brushing teeth: None  Eating Meals: None  Daily Activity - Total Score: 21                    EDUCATION:     Education Documentation  ADL Training, taught by Letty Mi OT at 12/8/2024 11:40 AM.  Learner: Patient  Readiness: Acceptance  Method: Explanation  Response: Verbalizes Understanding    Education Comments  No comments found.        Goals:   Encounter Problems       Encounter Problems (Active)       OT Goals       Increase functional  transfers & mobility  to/from bed, chair & commode to modified independent with DME for safety  (Progressing)       Start:  12/08/24    Expected End:  12/22/24            Increase LE bathing, dressing & toileting to modified independent with adaptive equipment as needed.  (Progressing)       Start:  12/08/24    Expected End:  12/22/24            Demonstrate compliance to compensatory techs and safety techs during ADLs   (Progressing)       Start:  12/08/24    Expected End:  12/22/24

## 2024-12-08 NOTE — PROGRESS NOTES
Physical Therapy    Physical Therapy Evaluation    Patient Name: Erin Martel  MRN: [id number]  Today's Date: 12/8/2024   Time Calculation  Start Time: 0902  Stop Time: 0925  Time Calculation (min): 23 min  338/338-A    Assessment/Plan   PT Assessment  PT Assessment Results: Decreased strength, Decreased endurance, Impaired balance, Decreased mobility, Orthopedic restrictions, Pain  Rehab Prognosis: Good  Evaluation/Treatment Tolerance: Patient tolerated treatment well  Medical Staff Made Aware: Yes  Strengths: Ability to acquire knowledge, Attitude of self, Capable of completing ADLs semi/independent, Housing layout, Insight into problems, Premorbid level of function, Support of Caregivers  End of Session Communication: Bedside nurse  Assessment Comment: Pt presents /c above impairments and decline from functional baseline s/p fall /c sustained pelvic fx. Pt will benefit from continued PT services at low intensity /c initial 24hr support to address above and maximize functional mobility. Rec WW for home.    PT returned at 1300 to vend adult WW for home going needs/safety.    End of Session Patient Position: Bed, 2 rail up, Alarm off, not on at start of session  IP OR SWING BED PT PLAN  Inpatient or Swing Bed: Inpatient  PT Plan  Treatment/Interventions: Bed mobility, Transfer training, Gait training, Balance training, Neuromuscular re-education, Strengthening, Endurance training, Therapeutic exercise, Therapeutic activity, Stair training  PT Plan: Ongoing PT  PT Frequency: 3 times per week  PT Discharge Recommendations: Low intensity level of continued care  Equipment Recommended upon Discharge: Wheeled walker; vended 12/8/24  PT - OK to Discharge: Yes    Subjective     Current Problem:  1. Fall, initial encounter        2. Closed nondisplaced fracture of anterior wall of left acetabulum, initial encounter (Multi)          Patient Active Problem List   Diagnosis    Breast cancer (Multi)    Diabetes mellitus type  2, controlled (Multi)    Hyperlipidemia    Left thyroid nodule    Thyromegaly    Fall due to ice or snow, initial encounter       General Visit Information:  General  Reason for Referral: PT eval and treat  Referred By: Tramaine  Past Medical History Relevant to Rehab: DM, HTN, HLD, breast ca s/p mastectomy  Co-Treatment: OT  Co-Treatment Reason: possible two person assist, maximize functional mobility  Prior to Session Communication: Bedside nurse  Patient Position Received: Bed, 2 rail up, Alarm off, not on at start of session (EOB)  General Comment: Pt presents after a fall in her driveway. CT pelvis: L acetabulum fx and L inferior pubic ramus fx, small hemorrhage L hemipelvis. Per trauma note: ortho no plan for sx, WBAT. Pt cleared for therapy by nursing. Pt EOB, agreeable.    Home Living:  Home Living  Home Living Comments: Pt lives alone in ranch style home. 3STE /c rail. Pt states she plans to stay at her son's house upon d/c. Also ranch style home.    Prior Level of Function:  Prior Function Per Pt/Caregiver Report  Prior Function Comments: Indep at baseline. Denies AD use or other falls. Pt drives. Stays active by going to local rec center, enjoys walking. Has done water aerobics in the past.    Precautions:  Precautions  Precautions Comment: pelvic fx, WBAT, falls      Objective     Pain:  Pain Assessment  Pain Assessment: 0-10  0-10 (Numeric) Pain Score: 6 (Pt c/o L hip pain, states meds in place)    Cognition:  Cognition  Overall Cognitive Status: Within Functional Limits  Orientation Level: Oriented X4    General Assessments:  General Observation  General Observation: activity orders: OOB /c A  lines: piv  skin integrity: WFL   Activity Tolerance  Endurance: Tolerates 10 - 20 min exercise with multiple rests  Sensation  Sensation Comment: denies n/t  Strength  Strength Comments: BLE at least 3/5     Coordination  Movements are Fluid and Coordinated: Yes     Dynamic Sitting Balance  Dynamic  Sitting-Comments: G  Dynamic Standing Balance  Dynamic Standing-Comments: F+    Functional Assessments:        Transfers  Transfer: Yes  Transfer 1  Trials/Comments 1: Sit<>Stand /c supervision, min VCs  Ambulation/Gait Training  Ambulation/Gait Training Performed:  (Pt ambulates 15'x2 /c WW, CGA-SBA. Slow step through gait, antalgic pattern, but no instabiility. Pt would benefit from being vended WW for home going needs.)            Outcome Measures:     [facility] Basic Mobility  Turning from your back to your side while in a flat bed without using bedrails: A little  Moving from lying on your back to sitting on the side of a flat bed without using bedrails: A little  Moving to and from bed to chair (including a wheelchair): A little  Standing up from a chair using your arms (e.g. wheelchair or bedside chair): A little  To walk in hospital room: A little  Climbing 3-5 steps with railing: A little  Basic Mobility - Total Score: 18             Goals:  Encounter Problems       Encounter Problems (Active)       PT Problem       STG - Pt will transition supine <> sitting with mod I (Progressing)       Start:  12/08/24    Expected End:  12/22/24            STG - Pt will transfer STS with mod I (Progressing)       Start:  12/08/24    Expected End:  12/22/24            STG - Pt will amb >/=150' using LRAD with mod I (Progressing)       Start:  12/08/24    Expected End:  12/22/24            STG -  Pt will navigate 4 stairs using rail with supervision (Progressing)       Start:  12/08/24    Expected End:  12/22/24                 Education Documentation  Precautions, taught by Aissatou Colón PT at 12/8/2024 12:14 PM.  Learner: Patient  Readiness: Acceptance  Method: Explanation  Response: Verbalizes Understanding, Needs Reinforcement    Mobility Training, taught by Aissatou Colón PT at 12/8/2024 12:14 PM.  Learner: Patient  Readiness: Acceptance  Method: Explanation  Response: Verbalizes Understanding, Needs  Reinforcement    Education Comments  No comments found.

## 2024-12-08 NOTE — NURSING NOTE
1545: Discharge paperwork went over with pt and family, all questions answered at this time. IV and tele removed. Pt to be dc home with son.

## 2024-12-08 NOTE — PROGRESS NOTES
12/08/24 1424   Discharge Planning   Living Arrangements Alone   Support Systems Children   Assistance Needed none   Type of Residence Private residence   Expected Discharge Disposition Home   Does the patient need discharge transport arranged? No     12/8/2024  Met with pt and family in room, introduced self and explained role.  Per pt, ok to speak with family present.    PCP- Stef Enriquez  Pharmacy- [facility] [place] [facility] [address].        Able to obtain and afford medications.   Pt is from home, lives alone. Independent. Stated she just got a walker. Performs own ADL's.  3 steps into the home. Stated no difficulty.  Still drives.  Does not feel she will have any needs upon discharge. Stated she is going to stay with children.   Therapies ordered. Ct Team will follow.  Mag Hanson RN [facility]

## 2024-12-08 NOTE — DISCHARGE SUMMARY
Discharge Diagnosis  Fall due to ice or snow, initial encounter    Issues Requiring Follow-Up  Follow up with Ortho as needed.  Continue to see PCP and follow up with  home care for PT/OT    Test Results Pending At Discharge  Pending Labs       No current pending labs.            Hospital Course   Erin Martel is a 79 y.o. female with a PMH of HTN, HLD, NIDDM Type 2, and breast CA s/p right mastectomy who presented to the ED on 12/7 after a slip and fall on ice in her driveway that same day.    CT A/P done in ED shows fractures of the anterior left acetabulum as well as a left inferior pubic ramus.  There is also a small hemorrhage in the left hemipelvis with prominent edema in the left adductor musculature.  There is a compression deformity at L3, which the patient states is chronic from a previous fall. Per orthopedic surgery recommendations to ED provider, patient was okay to ambulate as tolerated and could have been discharged home from ED if able. Decision was made to admit to trauma service for observation, pain management, and evaluation by PT/OT for potential needs.   AM labs stable without s/s ongoing bleeding. Patient's ROM was preserved. Worked well with PT.    On day of discharge all vital signs, laboratory data, and physical exam findings were reviewed and patient was deemed appropriate for hospital discharge. At the time of discharge, patient's pain was controlled with oral analgesia, patient was urinating, sleeping, and eating well. Based on PT/OT's recommendation, patient was discharged home with [facility] with a new script for PRN oxycodone for severe pain as well as extra strength Tylenol, ibuprofen, and gabapentin for milder pain.   Discharge plan was discussed with the patient and all of the patient's questions were answered. Patient verbalized understanding of discharge instructions. She is going to stay with her son for 2 days to ensure she can safely ambulate.      Pertinent Physical Exam At  Time of Discharge  Physical Exam  Vitals reviewed.   Constitutional:       General: She is not in acute distress.     Appearance: Normal appearance. She is normal weight.   HENT:      Head: Normocephalic and atraumatic.      Nose: Nose normal.      Mouth/Throat:      Mouth: Mucous membranes are moist.   Eyes:      General: No scleral icterus.  Cardiovascular:      Rate and Rhythm: Normal rate and regular rhythm.      Pulses: Normal pulses.   Pulmonary:      Effort: Pulmonary effort is normal. No respiratory distress.      Breath sounds: Normal breath sounds.   Chest:   Breasts:     Left: Absent.   Abdominal:      General: Bowel sounds are normal. There is no distension.      Palpations: Abdomen is soft.   Genitourinary:     Comments: External catheter- had leaked- urine soaked in bed  Musculoskeletal:         General: Normal range of motion.      Cervical back: Normal range of motion. No tenderness.      Comments: No pain with AP/lateral compression of pelvis. No pain with ROM of BLE   Neurological:      General: No focal deficit present.      Mental Status: She is alert and oriented to person, place, and time.      Comments: 5/5 strength in BUE and BLE   Psychiatric:         Mood and Affect: Mood normal.         Behavior: Behavior normal.         Home Medications     Medication List      START taking these medications     acetaminophen 325 mg tablet; Commonly known as: Tylenol; Take 3 tablets   (975 mg) by mouth every 6 hours for 7 days.   gabapentin 100 mg capsule; Commonly known as: Neurontin; Take 1 capsule   (100 mg) by mouth 3 times a day for 7 days.   ibuprofen 600 mg tablet; Take 1 tablet (600 mg) by mouth 3 times a day   as needed for mild pain (1 - 3) or moderate pain (4 - 6) for up to 7 days.   oxyCODONE 5 mg immediate release tablet; Commonly known as: Roxicodone;   Take 1 tablet (5 mg) by mouth 3 times a day as needed for severe pain (7 -   10).     CONTINUE taking these medications     atorvastatin 40  mg tablet; Commonly known as: Lipitor; Take 1 tablet (40   mg) by mouth once daily.   ciclopirox 8 % solution; Commonly known as: Penlac   glimepiride 2 mg tablet; Commonly known as: Amaryl; TAKE 1 TABLET TWICE   DAILY (NEED MD APPOINTMENT)   metFORMIN  mg 24 hr tablet; Commonly known as: Glucophage-XR   ramipril 5 mg capsule; Commonly known as: Altace       Outpatient Follow-Up  No future appointments.    Sonam Euceda, APRN-CNP

## 2024-12-09 ENCOUNTER — HOME HEALTH ADMISSION (OUTPATIENT)
Dept: HOME HEALTH SERVICES | Facility: HOME HEALTH | Age: 79
End: 2024-12-09
Payer: MEDICARE

## 2024-12-09 ENCOUNTER — DOCUMENTATION (OUTPATIENT)
Dept: HOME HEALTH SERVICES | Facility: HOME HEALTH | Age: 79
End: 2024-12-09
Payer: MEDICARE

## 2024-12-09 ENCOUNTER — DOCUMENTATION (OUTPATIENT)
Dept: ORTHOPEDIC SURGERY | Facility: CLINIC | Age: 79
End: 2024-12-09

## 2024-12-09 NOTE — HH CARE COORDINATION
Home Care received a Referral for Physical Therapy and Occupational Therapy. We have processed the referral for a Start of Care on 12/10 - 12/11/24.     If you have any questions or concerns, please feel free to contact us at [phone]. Follow the prompts, enter your five digit zip code, and you will be directed to your care team on [facility].

## 2024-12-09 NOTE — CONSULTS
"Reason For Consult  Pelvic fracture    History Of Present Illness  Erin Martel is a 79 y.o. female presenting for evaluation of left-sided pelvic pain.  She presented to the [facility] emergency department on 12/8/2024 after a ground-level fall in her driveway.  She reported going to her mailbox around 4 PM and believes she slipped on some ice and fell onto the left side.  She denies any previous history of fracture.  No reports of distal numbness or tingling today.  She denies any loss of consciousness.  Orthopedics consulted due to imaging findings of left anterior cranial fracture.     Past Medical History  She has a past medical history of Malignant neoplasm of unspecified site of right female breast.    Surgical History  She has a past surgical history that includes Mastectomy (Right, 1987).     Social History  She has no history on file for tobacco use, alcohol use, and drug use.    Family History  Family History   Problem Relation Name Age of Onset    Breast cancer Sister  60 - 69        Allergies  Patient has no known allergies.    Review of Systems  As per HPI.     Physical Exam  This is a well-appearing female no acute distress.  No tenderness palpation along the lateral hip.  5/5 strength in tibialis anterior, EHL, gastrocsoleus.  She is sensate to light touch in the sural, saphenous, SP, DP, tibial nerve distributions.  Warm well-perfused distally.    X-ray imaging and CT findings of the pelvis reveal a left inferior pubic rami fracture extending into the anterior acetabulum.     Last Recorded Vitals  Blood pressure 131/61, pulse 79, temperature 36.4 °C (97.5 °F), temperature source Temporal, resp. rate 16, height 1.626 m (5' 4\"), weight 56.7 kg (125 lb), SpO2 96%.    Relevant Results      Scheduled medications    Continuous medications    PRN medications    No results found for this or any previous visit (from the past 24 hours).     Assessment/Plan     This is an 79-year-old female with left-sided " inferior pubic rami fracture.  No acute orthopedic intervention warranted at this time.    - Weightbearing as tolerated left lower extremity.  - Pain control as needed.  - Recommend Lovenox daily for DVT prophylaxis x6 weeks.  - The patient will follow-up in the outpatient setting in 2-3 weeks.    Raimundo Macias MD MPH

## 2024-12-15 LAB
ATRIAL RATE: 93 BPM
P AXIS: 51 DEGREES
PR INTERVAL: 157 MS
Q ONSET: 252 MS
QRS COUNT: 15 BEATS
QRS DURATION: 73 MS
QT INTERVAL: 346 MS
QTC CALCULATION(BAZETT): 431 MS
QTC FREDERICIA: 400 MS
R AXIS: -23 DEGREES
T AXIS: 61 DEGREES
T OFFSET: 425 MS
VENTRICULAR RATE: 93 BPM

## 2024-12-16 ENCOUNTER — HOME CARE VISIT (OUTPATIENT)
Dept: HOME HEALTH SERVICES | Facility: HOME HEALTH | Age: 79
End: 2024-12-16
Payer: MEDICARE

## 2024-12-16 VITALS — HEART RATE: 70 BPM | DIASTOLIC BLOOD PRESSURE: 80 MMHG | SYSTOLIC BLOOD PRESSURE: 154 MMHG

## 2024-12-16 PROCEDURE — 169592 NO-PAY CLAIM PROCEDURE

## 2024-12-16 PROCEDURE — G0151 HHCP-SERV OF PT,EA 15 MIN: HCPCS | Mod: HHH

## 2024-12-16 ASSESSMENT — ENCOUNTER SYMPTOMS: OCCASIONAL FEELINGS OF UNSTEADINESS: 0

## 2024-12-16 ASSESSMENT — ACTIVITIES OF DAILY LIVING (ADL)
ENTERING_EXITING_HOME: ONE PERSON
OASIS_M1830: 03

## 2024-12-17 ENCOUNTER — HOME CARE VISIT (OUTPATIENT)
Dept: HOME HEALTH SERVICES | Facility: HOME HEALTH | Age: 79
End: 2024-12-17
Payer: MEDICARE

## 2024-12-17 VITALS — OXYGEN SATURATION: 98 % | DIASTOLIC BLOOD PRESSURE: 70 MMHG | HEART RATE: 96 BPM | SYSTOLIC BLOOD PRESSURE: 150 MMHG

## 2024-12-17 PROCEDURE — G0152 HHCP-SERV OF OT,EA 15 MIN: HCPCS | Mod: HHH

## 2024-12-17 ASSESSMENT — ENCOUNTER SYMPTOMS
PAIN LOCATION - PAIN SEVERITY: 8/10
PAIN: 1
SUBJECTIVE PAIN PROGRESSION: GRADUALLY IMPROVING
PAIN LOCATION - RELIEVING FACTORS: MEDS/REST
PAIN LOCATION: RIGHT HIP
PAIN LOCATION - EXACERBATING FACTORS: MOVEMENT
PAIN LOCATION - PAIN FREQUENCY: FREQUENT
HIGHEST PAIN SEVERITY IN PAST 24 HOURS: 9/10
LOWEST PAIN SEVERITY IN PAST 24 HOURS: 6/10
PERSON REPORTING PAIN: PATIENT

## 2024-12-17 ASSESSMENT — ACTIVITIES OF DAILY LIVING (ADL)
TOILETING: 1
TOILETING: INDEPENDENT
BATHING ASSESSED: 1
DRESSING_LB_CURRENT_FUNCTION: INDEPENDENT
BATHING_CURRENT_FUNCTION: SUPERVISION
PREPARING MEALS: INDEPENDENT
DRESSING_UB_CURRENT_FUNCTION: INDEPENDENT

## 2024-12-19 ENCOUNTER — HOME CARE VISIT (OUTPATIENT)
Dept: HOME HEALTH SERVICES | Facility: HOME HEALTH | Age: 79
End: 2024-12-19
Payer: MEDICARE

## 2024-12-19 PROCEDURE — G0157 HHC PT ASSISTANT EA 15: HCPCS | Mod: CQ,HHH

## 2024-12-19 ASSESSMENT — ENCOUNTER SYMPTOMS
PAIN LOCATION: PELVIS
SUBJECTIVE PAIN PROGRESSION: UNCHANGED
PERSON REPORTING PAIN: PATIENT
PAIN: 1
HIGHEST PAIN SEVERITY IN PAST 24 HOURS: 6/10

## 2024-12-24 ENCOUNTER — HOME CARE VISIT (OUTPATIENT)
Dept: HOME HEALTH SERVICES | Facility: HOME HEALTH | Age: 79
End: 2024-12-24
Payer: MEDICARE

## 2024-12-24 PROCEDURE — G0157 HHC PT ASSISTANT EA 15: HCPCS | Mod: CQ,HHH

## 2024-12-24 ASSESSMENT — ENCOUNTER SYMPTOMS
HIGHEST PAIN SEVERITY IN PAST 24 HOURS: 6/10
SUBJECTIVE PAIN PROGRESSION: GRADUALLY IMPROVING
PAIN: 1
PERSON REPORTING PAIN: PATIENT
PAIN LOCATION: LEFT HIP

## 2024-12-31 ENCOUNTER — HOME CARE VISIT (OUTPATIENT)
Dept: HOME HEALTH SERVICES | Facility: HOME HEALTH | Age: 79
End: 2024-12-31
Payer: MEDICARE

## 2024-12-31 PROCEDURE — G0157 HHC PT ASSISTANT EA 15: HCPCS | Mod: CQ,HHH

## 2024-12-31 ASSESSMENT — ENCOUNTER SYMPTOMS
PERSON REPORTING PAIN: PATIENT
DENIES PAIN: 1

## 2025-01-03 ENCOUNTER — HOME CARE VISIT (OUTPATIENT)
Dept: HOME HEALTH SERVICES | Facility: HOME HEALTH | Age: 80
End: 2025-01-03
Payer: MEDICARE

## 2025-01-03 PROCEDURE — G0151 HHCP-SERV OF PT,EA 15 MIN: HCPCS | Mod: HHH

## 2025-01-03 ASSESSMENT — ENCOUNTER SYMPTOMS
HIGHEST PAIN SEVERITY IN PAST 24 HOURS: 5/10
OCCASIONAL FEELINGS OF UNSTEADINESS: 0
PERSON REPORTING PAIN: PATIENT
PAIN LOCATION - PAIN SEVERITY: 0/10
LOWEST PAIN SEVERITY IN PAST 24 HOURS: 0/10
PAIN LOCATION: COCCYX
PAIN LOCATION - EXACERBATING FACTORS: SITTING
PAIN: 1

## 2025-01-03 ASSESSMENT — ACTIVITIES OF DAILY LIVING (ADL)
OASIS_M1830: 00
HOME_HEALTH_OASIS: 00